# Patient Record
Sex: FEMALE | Race: OTHER | HISPANIC OR LATINO | URBAN - METROPOLITAN AREA
[De-identification: names, ages, dates, MRNs, and addresses within clinical notes are randomized per-mention and may not be internally consistent; named-entity substitution may affect disease eponyms.]

---

## 2021-03-01 ENCOUNTER — OFFICE (OUTPATIENT)
Dept: URBAN - METROPOLITAN AREA CLINIC 79 | Facility: CLINIC | Age: 36
End: 2021-03-01
Payer: MEDICAID

## 2021-03-01 VITALS
HEART RATE: 76 BPM | DIASTOLIC BLOOD PRESSURE: 85 MMHG | HEIGHT: 63 IN | TEMPERATURE: 97.1 F | SYSTOLIC BLOOD PRESSURE: 138 MMHG | WEIGHT: 198.4 LBS

## 2021-03-01 DIAGNOSIS — R19.4 CHANGE IN BOWEL HABIT: ICD-10-CM

## 2021-03-01 DIAGNOSIS — E11.9 TYPE 2 DIABETES MELLITUS WITHOUT COMPLICATIONS: ICD-10-CM

## 2021-03-01 DIAGNOSIS — K59.09 OTHER CONSTIPATION: ICD-10-CM

## 2021-03-01 DIAGNOSIS — K62.5 HEMORRHAGE OF ANUS AND RECTUM: ICD-10-CM

## 2021-03-01 DIAGNOSIS — R14.0 ABDOMINAL DISTENSION (GASEOUS): ICD-10-CM

## 2021-03-01 PROCEDURE — 99204 OFFICE O/P NEW MOD 45 MIN: CPT | Performed by: PHYSICIAN ASSISTANT

## 2021-03-01 RX ORDER — FAMOTIDINE 20 MG/1
TABLET, FILM COATED ORAL
Qty: 60 | Refills: 6 | Status: COMPLETED
Start: 2021-03-01 | End: 2021-04-01

## 2021-03-01 NOTE — INTERFACERESULTNOTES
Pt aware of results via phone call w/ [facility]. E-prescribed prescriptions. Transferred to [facility] to schedule follow up visit.

## 2021-03-05 LAB — H. PYLORI STOOL AG, EIA: POSITIVE

## 2021-04-01 ENCOUNTER — OFFICE (OUTPATIENT)
Dept: URBAN - METROPOLITAN AREA CLINIC 79 | Facility: CLINIC | Age: 36
End: 2021-04-01
Payer: MEDICAID

## 2021-04-01 VITALS
DIASTOLIC BLOOD PRESSURE: 86 MMHG | WEIGHT: 198.6 LBS | HEART RATE: 80 BPM | TEMPERATURE: 97 F | SYSTOLIC BLOOD PRESSURE: 141 MMHG | HEIGHT: 63 IN

## 2021-04-01 DIAGNOSIS — B96.81 HELICOBACTER PYLORI [H. PYLORI] AS THE CAUSE OF DISEASES CLA: ICD-10-CM

## 2021-04-01 DIAGNOSIS — K62.5 HEMORRHAGE OF ANUS AND RECTUM: ICD-10-CM

## 2021-04-01 DIAGNOSIS — K59.09 OTHER CONSTIPATION: ICD-10-CM

## 2021-04-01 DIAGNOSIS — R14.0 ABDOMINAL DISTENSION (GASEOUS): ICD-10-CM

## 2021-04-01 DIAGNOSIS — E11.9 TYPE 2 DIABETES MELLITUS WITHOUT COMPLICATIONS: ICD-10-CM

## 2021-04-01 PROCEDURE — 99214 OFFICE O/P EST MOD 30 MIN: CPT | Performed by: PHYSICIAN ASSISTANT

## 2021-04-01 RX ORDER — FAMOTIDINE 40 MG/1
TABLET, FILM COATED ORAL
Qty: 60 | Refills: 11 | Status: COMPLETED
Start: 2021-04-01 | End: 2022-08-08

## 2021-04-01 RX ORDER — LIDOCAINE 50 MG/G
CREAM TOPICAL
Qty: 30 | Refills: 3 | Status: COMPLETED
Start: 2021-04-01 | End: 2022-08-08

## 2021-04-01 RX ORDER — LINACLOTIDE 145 UG/1
CAPSULE, GELATIN COATED ORAL
Qty: 30 | Refills: 1 | Status: COMPLETED
Start: 2021-04-01 | End: 2022-08-08

## 2021-04-01 NOTE — SERVICEHPINOTES
OG JOSHUA   is a   34 yo white  female who is here for f/u h. pylori. She was last seen in 03/01/2021 for abdominal pain, bloating, rectal bleeding, constipation. Her stool test + H pylori so started on treatment (see below). She informs of treatment completion in the next 3 days. She already notes improvement to her abdominal pain and bloating symptom. She is here to discuss H pylori diagnosis and post treatment repeat stool test to ensure eradication of infection.Concerning her rectal bleeding that is intermittent with BRBPR seen on wipe and toilet bowel water: No change to this symptom since 11/2020. She has ongoing chronic constipation manifesting with BMs every 2-3 days and straining needed to achieve a BM. She has tried Miralax BID-TID for 2 weeks since last seen which has not provided resolution to her constipation. Prior trial of high fiber diet and supplements made no difference. She notes LUQ pain described as "pressure" that improves with defecation. Upcoming colonoscopy for mid April with Dr Green. No h/o cardiac or pulmonary disease. She notes "heart palpitations/chest pain" when under stress/anxiety: No dyspnea on exertion and able to climb at least 4 flights of stairs with no need to stop to catch her breath or chest pain. No prior colonoscopy. Stable weight. No other complaints.

## 2022-08-08 ENCOUNTER — OFFICE (OUTPATIENT)
Dept: URBAN - METROPOLITAN AREA CLINIC 79 | Facility: CLINIC | Age: 37
End: 2022-08-08

## 2022-08-08 ENCOUNTER — OFFICE (OUTPATIENT)
Dept: URBAN - METROPOLITAN AREA CLINIC 79 | Facility: CLINIC | Age: 37
End: 2022-08-08
Payer: MEDICAID

## 2022-08-08 VITALS
WEIGHT: 182 LBS | HEART RATE: 82 BPM | SYSTOLIC BLOOD PRESSURE: 141 MMHG | TEMPERATURE: 97.3 F | DIASTOLIC BLOOD PRESSURE: 92 MMHG | HEIGHT: 63 IN

## 2022-08-08 DIAGNOSIS — I10 ESSENTIAL (PRIMARY) HYPERTENSION: ICD-10-CM

## 2022-08-08 DIAGNOSIS — D25.9 LEIOMYOMA OF UTERUS, UNSPECIFIED: ICD-10-CM

## 2022-08-08 DIAGNOSIS — R10.9 UNSPECIFIED ABDOMINAL PAIN: ICD-10-CM

## 2022-08-08 DIAGNOSIS — E11.9 TYPE 2 DIABETES MELLITUS WITHOUT COMPLICATIONS: ICD-10-CM

## 2022-08-08 DIAGNOSIS — K62.5 HEMORRHAGE OF ANUS AND RECTUM: ICD-10-CM

## 2022-08-08 DIAGNOSIS — K59.09 OTHER CONSTIPATION: ICD-10-CM

## 2022-08-08 PROCEDURE — 00031: CPT | Performed by: INTERNAL MEDICINE

## 2022-08-08 PROCEDURE — 99214 OFFICE O/P EST MOD 30 MIN: CPT | Performed by: PHYSICIAN ASSISTANT

## 2022-08-08 NOTE — SERVICEHPINOTES
OG JOSHUA   is a   36  female who complains of "blood in stool." Latest event end of June that led to [facility] visit (requesting records). She was last seen by our office in 04/2022 for same concern that led to recommendation to have colonoscopy, which was NOT done due to moving homes. She informs of intermittent events of BRBPR since 11/2020. She has ongoing chronic constipation manifesting with BMs every 2-3 days and straining needed to achieve a BM. At times diarrhea 2-4 x/month: No identifiable triggers. She has tried Miralax in the past used BID-TID every few weeks for rescue. Prior trial of high fiber diet and supplements made no difference. She is currently taking "prune laxative juice" once a night that helps achieve a BM that day it is used. She mentions left sided abdominal pain described as "pressure" to "cramp" sensation that improves with defecation. No prior colonoscopy. Stable weight. No other complaints.

## 2022-09-29 ENCOUNTER — OFFICE (OUTPATIENT)
Dept: URBAN - METROPOLITAN AREA CLINIC 30 | Facility: CLINIC | Age: 37
End: 2022-09-29
Payer: MEDICAID

## 2022-09-29 VITALS
HEART RATE: 93 BPM | HEART RATE: 131 BPM | RESPIRATION RATE: 14 BRPM | HEIGHT: 63 IN | HEART RATE: 84 BPM | HEART RATE: 86 BPM | TEMPERATURE: 98.2 F | OXYGEN SATURATION: 97 % | SYSTOLIC BLOOD PRESSURE: 133 MMHG | OXYGEN SATURATION: 93 % | OXYGEN SATURATION: 96 % | DIASTOLIC BLOOD PRESSURE: 84 MMHG | TEMPERATURE: 97.9 F | RESPIRATION RATE: 27 BRPM | SYSTOLIC BLOOD PRESSURE: 121 MMHG | DIASTOLIC BLOOD PRESSURE: 93 MMHG | SYSTOLIC BLOOD PRESSURE: 141 MMHG | RESPIRATION RATE: 16 BRPM | SYSTOLIC BLOOD PRESSURE: 126 MMHG | WEIGHT: 185 LBS | RESPIRATION RATE: 23 BRPM | DIASTOLIC BLOOD PRESSURE: 87 MMHG | DIASTOLIC BLOOD PRESSURE: 85 MMHG

## 2022-09-29 DIAGNOSIS — K62.5 HEMORRHAGE OF ANUS AND RECTUM: ICD-10-CM

## 2022-09-29 DIAGNOSIS — K59.09 OTHER CONSTIPATION: ICD-10-CM

## 2022-09-29 RX ORDER — LINACLOTIDE 145 UG/1
CAPSULE, GELATIN COATED ORAL
Qty: 30 | Refills: 5 | Status: ACTIVE
Start: 2022-09-29

## 2025-08-21 ENCOUNTER — OFFICE (OUTPATIENT)
Dept: URBAN - METROPOLITAN AREA CLINIC 79 | Facility: CLINIC | Age: 40
End: 2025-08-21
Payer: MEDICAID

## 2025-08-21 VITALS
HEIGHT: 63 IN | SYSTOLIC BLOOD PRESSURE: 142 MMHG | TEMPERATURE: 97.3 F | WEIGHT: 172 LBS | HEART RATE: 72 BPM | DIASTOLIC BLOOD PRESSURE: 93 MMHG

## 2025-08-21 DIAGNOSIS — K59.09 OTHER CONSTIPATION: ICD-10-CM

## 2025-08-21 DIAGNOSIS — K52.9 NONINFECTIVE GASTROENTERITIS AND COLITIS, UNSPECIFIED: ICD-10-CM

## 2025-08-21 DIAGNOSIS — R14.0 ABDOMINAL DISTENSION (GASEOUS): ICD-10-CM

## 2025-08-21 PROCEDURE — 99214 OFFICE O/P EST MOD 30 MIN: CPT | Performed by: PHYSICIAN ASSISTANT

## 2025-08-21 RX ORDER — POLYETHYLENE GLYCOL-3350 AND ELECTROLYTES WITH FLAVOR PACK 240; 5.84; 2.98; 6.72; 22.72 G/278.26G; G/278.26G; G/278.26G; G/278.26G; G/278.26G
POWDER, FOR SOLUTION ORAL
Qty: 4000 | Refills: 0 | Status: ACTIVE
Start: 2025-08-21

## 2025-08-21 RX ORDER — LINACLOTIDE 145 UG/1
CAPSULE, GELATIN COATED ORAL
Qty: 30 | Refills: 3 | Status: ACTIVE
Start: 2025-08-21

## 2025-08-21 RX ORDER — LINACLOTIDE 72 UG/1
CAPSULE, GELATIN COATED ORAL
Qty: 30 | Refills: 3 | Status: ACTIVE
Start: 2025-08-21